# Patient Record
Sex: FEMALE | Race: WHITE | Employment: UNEMPLOYED | URBAN - METROPOLITAN AREA
[De-identification: names, ages, dates, MRNs, and addresses within clinical notes are randomized per-mention and may not be internally consistent; named-entity substitution may affect disease eponyms.]

---

## 2017-01-11 ENCOUNTER — OFFICE VISIT (OUTPATIENT)
Dept: FAMILY MEDICINE CLINIC | Facility: CLINIC | Age: 37
End: 2017-01-11

## 2017-01-11 VITALS
RESPIRATION RATE: 16 BRPM | BODY MASS INDEX: 33 KG/M2 | SYSTOLIC BLOOD PRESSURE: 136 MMHG | OXYGEN SATURATION: 98 % | TEMPERATURE: 99 F | HEART RATE: 72 BPM | WEIGHT: 183 LBS | DIASTOLIC BLOOD PRESSURE: 86 MMHG

## 2017-01-11 DIAGNOSIS — J03.90 TONSILLITIS: ICD-10-CM

## 2017-01-11 DIAGNOSIS — Z13.0 SCREENING FOR ENDOCRINE, METABOLIC AND IMMUNITY DISORDER: Primary | ICD-10-CM

## 2017-01-11 DIAGNOSIS — Z13.228 SCREENING FOR ENDOCRINE, METABOLIC AND IMMUNITY DISORDER: Primary | ICD-10-CM

## 2017-01-11 DIAGNOSIS — Z13.29 SCREENING FOR ENDOCRINE, METABOLIC AND IMMUNITY DISORDER: Primary | ICD-10-CM

## 2017-01-11 DIAGNOSIS — Z86.39 HISTORY OF IRON DEFICIENCY: ICD-10-CM

## 2017-01-11 LAB
CONTROL LINE PRESENT WITH A CLEAR BACKGROUND (YES/NO): YES YES/NO
STREP GRP A CUL-SCR: NEGATIVE

## 2017-01-11 PROCEDURE — 87880 STREP A ASSAY W/OPTIC: CPT | Performed by: PHYSICIAN ASSISTANT

## 2017-01-11 PROCEDURE — 99213 OFFICE O/P EST LOW 20 MIN: CPT | Performed by: PHYSICIAN ASSISTANT

## 2017-01-11 PROCEDURE — 87081 CULTURE SCREEN ONLY: CPT | Performed by: PHYSICIAN ASSISTANT

## 2017-01-11 RX ORDER — AMOXICILLIN 500 MG/1
500 CAPSULE ORAL 3 TIMES DAILY
Qty: 30 CAPSULE | Refills: 0 | Status: SHIPPED | OUTPATIENT
Start: 2017-01-11 | End: 2017-01-21

## 2017-01-11 NOTE — PROGRESS NOTES
CHIEF COMPLAINT:   Patient presents with:  Sore Throat: Pt c/o sore throat X 5 days       HPI:   Deana Steel is a 39year old female presents to clinic with symptoms of sore throat for 5 days. Admits to gland swelling.  She admits to white patches on [E]; Future  -     Vitamin D, 25-Hydroxy [E]; Future  -     TSH W Reflex To Free T4 [E]; Future    Tonsillitis  -     Rapid Strep Negative in office. Culture sent out   -     Will exudates present will treat with amoxicillin 500 MG Oral Cap;  Take 1 capsule

## 2017-11-30 ENCOUNTER — OFFICE VISIT (OUTPATIENT)
Dept: FAMILY MEDICINE CLINIC | Facility: CLINIC | Age: 37
End: 2017-11-30

## 2017-11-30 VITALS
HEIGHT: 63 IN | SYSTOLIC BLOOD PRESSURE: 120 MMHG | OXYGEN SATURATION: 98 % | HEART RATE: 90 BPM | TEMPERATURE: 98 F | BODY MASS INDEX: 31.89 KG/M2 | WEIGHT: 180 LBS | RESPIRATION RATE: 16 BRPM | DIASTOLIC BLOOD PRESSURE: 80 MMHG

## 2017-11-30 DIAGNOSIS — Z13.29 SCREENING FOR ENDOCRINE, NUTRITIONAL, METABOLIC AND IMMUNITY DISORDER: ICD-10-CM

## 2017-11-30 DIAGNOSIS — Z23 NEED FOR TDAP VACCINATION: ICD-10-CM

## 2017-11-30 DIAGNOSIS — Z01.419 WELL FEMALE EXAM WITH ROUTINE GYNECOLOGICAL EXAM: Primary | ICD-10-CM

## 2017-11-30 DIAGNOSIS — Z02.1 PHYSICAL EXAM, PRE-EMPLOYMENT: ICD-10-CM

## 2017-11-30 DIAGNOSIS — Z13.228 SCREENING FOR ENDOCRINE, NUTRITIONAL, METABOLIC AND IMMUNITY DISORDER: ICD-10-CM

## 2017-11-30 DIAGNOSIS — Z12.4 SCREENING FOR CERVICAL CANCER: ICD-10-CM

## 2017-11-30 DIAGNOSIS — Z13.21 SCREENING FOR ENDOCRINE, NUTRITIONAL, METABOLIC AND IMMUNITY DISORDER: ICD-10-CM

## 2017-11-30 DIAGNOSIS — Z13.0 SCREENING FOR ENDOCRINE, NUTRITIONAL, METABOLIC AND IMMUNITY DISORDER: ICD-10-CM

## 2017-11-30 DIAGNOSIS — Z23 NEED FOR PROPHYLACTIC VACCINATION AND INOCULATION AGAINST INFLUENZA: ICD-10-CM

## 2017-11-30 PROCEDURE — 88175 CYTOPATH C/V AUTO FLUID REDO: CPT | Performed by: FAMILY MEDICINE

## 2017-11-30 PROCEDURE — 87624 HPV HI-RISK TYP POOLED RSLT: CPT | Performed by: FAMILY MEDICINE

## 2017-11-30 PROCEDURE — 90686 IIV4 VACC NO PRSV 0.5 ML IM: CPT | Performed by: FAMILY MEDICINE

## 2017-11-30 PROCEDURE — 90471 IMMUNIZATION ADMIN: CPT | Performed by: FAMILY MEDICINE

## 2017-11-30 PROCEDURE — 99395 PREV VISIT EST AGE 18-39: CPT | Performed by: FAMILY MEDICINE

## 2017-11-30 NOTE — PROGRESS NOTES
Kimmy Ferguson is a 40year old female.     CC:  Patient presents with:  Physical: Annual physical      HPI:  Annual Physical due on 02/26/1982  Annual Depression Screen due on 03/31/2017  Influenza Vaccine(1) due on 09/01/2017  Pap Smear,3 Years due lb      BP Readings from Last 3 Encounters:  11/30/17 : 120/80  01/11/17 : 136/86  07/27/16 : 120/80    REVIEW OF SYSTEMS:     GENERAL HEALTH: feels well, no fatigue.   SKIN: denies any unusual skin lesions or rashes  EYES: no visual complaints or deficits grossly intact  PSYCH: Normal affect and mood             ASSESSMENT/PLAN:      1. Well female exam with routine gynecological exam  Recommended low fat diet and aerobic exercise 30 minutes five times weekly.     2. Screening for endocrine, nutritional, met

## 2018-06-18 ENCOUNTER — OFFICE VISIT (OUTPATIENT)
Dept: FAMILY MEDICINE CLINIC | Facility: CLINIC | Age: 38
End: 2018-06-18

## 2018-06-18 VITALS
SYSTOLIC BLOOD PRESSURE: 110 MMHG | WEIGHT: 174 LBS | HEART RATE: 84 BPM | DIASTOLIC BLOOD PRESSURE: 84 MMHG | BODY MASS INDEX: 32.02 KG/M2 | HEIGHT: 62 IN | RESPIRATION RATE: 16 BRPM | OXYGEN SATURATION: 98 % | TEMPERATURE: 99 F

## 2018-06-18 DIAGNOSIS — Z91.038 ALLERGIC REACTION TO INSECT BITE: Primary | ICD-10-CM

## 2018-06-18 PROCEDURE — 99213 OFFICE O/P EST LOW 20 MIN: CPT | Performed by: FAMILY MEDICINE

## 2018-06-18 RX ORDER — EPINEPHRINE 0.3 MG/.3ML
0.3 INJECTION SUBCUTANEOUS ONCE
Qty: 1 EACH | Refills: 2 | Status: SHIPPED | OUTPATIENT
Start: 2018-06-18 | End: 2018-06-18

## 2018-06-18 NOTE — PATIENT INSTRUCTIONS
For mild allergic reaction  -- take Benadryl as needed  -- Zyrtec, Claritin or Allegra for mild daily symptoms    For additional control of a sudden reaction   -- add Pepcid or Zantac    If the reaction continues to worsen - then use EPI pen and proceed to needed. In extreme cases, you may not be able to get air into the lungs.   Call 911  Call 911 right away if the person has any of the following:  · Trouble breathing  · A history of airway swelling (anaphylaxis)  While you wait for help:  · Reassure the per

## 2018-06-18 NOTE — PROGRESS NOTES
Sanjuanita Lozada is a 45year old female. Patient presents with:  Swelling: right hand and arm swelling after a bug bite      HPI:   Bug bite with allergic reaction  Bit by fly?  Had swelling of right hand within 10 min  Swelling spread to elbow  No SOB, is 31.83 kg/m².       GENERAL: well developed, well nourished,in no apparent distress  SKIN: no rashes,no suspicious lesions  HEENT: atraumatic, normocephalic,PERRL, EOMI, nl conjunctiva, ears and throat are clear  NECK: supple,no adenopathy,  LUNGS: clear

## 2018-09-06 ENCOUNTER — E-VISIT (OUTPATIENT)
Dept: FAMILY MEDICINE CLINIC | Facility: CLINIC | Age: 38
End: 2018-09-06

## 2018-09-06 DIAGNOSIS — J02.9 PHARYNGITIS, UNSPECIFIED ETIOLOGY: Primary | ICD-10-CM

## 2018-09-06 PROCEDURE — 98969 ONLINE SERVICE BY HC PRO: CPT | Performed by: NURSE PRACTITIONER

## 2018-09-06 RX ORDER — AMOXICILLIN 500 MG/1
500 TABLET, FILM COATED ORAL 2 TIMES DAILY
Qty: 20 TABLET | Refills: 0 | Status: SHIPPED | OUTPATIENT
Start: 2018-09-06 | End: 2018-09-16

## 2018-09-07 NOTE — PROGRESS NOTES
Sunny Pressley is a 45year old female. HPI:   See answers to questions above. Current Outpatient Prescriptions:  amoxicillin 500 MG Oral Tab Take 1 tablet (500 mg total) by mouth 2 (two) times daily.  Disp: 20 tablet Rfl: 0      No past medical h

## (undated) NOTE — MR AVS SNAPSHOT
[address]  [address]  . [place] [id number] [phone]               Thank you for choosing us for your health care visit with Jaky Boothe PA-C.   We are glad to serve you and happy to provide you with this summar Comp Metabolic Panel (14) [E]    Complete by:  Jan 11, 2017 (Approximate)    Assoc Dx:  Screening for endocrine, metabolic and immunity disorder [Z13.29, Z13.228, Z13.0]           Lipid Panel [E]    Complete by:  Jan 11, 2017 (Approximate)    Assoc Dx:   Pratima Rain

## (undated) NOTE — Clinical Note
02/10/2017        Areli Kramer [address] [place] [id number]      Dear [facility],    [facility] records indicate that you have outstanding lab work and or testing that was ordered for you and has not yet been completed:        CBC W Differential W Pl

## (undated) NOTE — LETTER
01/03/18        Areli Kramer [address] [id number]      Dear [facility],    [facility] records indicate that you have outstanding lab work and or testing that was ordered for you and has not yet been completed:          Comp Metabolic Panel (1